# Patient Record
Sex: FEMALE | Race: WHITE | NOT HISPANIC OR LATINO | Employment: OTHER | URBAN - NONMETROPOLITAN AREA
[De-identification: names, ages, dates, MRNs, and addresses within clinical notes are randomized per-mention and may not be internally consistent; named-entity substitution may affect disease eponyms.]

---

## 2024-06-03 ENCOUNTER — PATIENT ROUNDING (BHMG ONLY) (OUTPATIENT)
Dept: UROLOGY | Facility: CLINIC | Age: 64
End: 2024-06-03
Payer: MEDICARE

## 2024-06-03 ENCOUNTER — OFFICE VISIT (OUTPATIENT)
Dept: UROLOGY | Facility: CLINIC | Age: 64
End: 2024-06-03
Payer: MEDICARE

## 2024-06-03 VITALS
BODY MASS INDEX: 30.02 KG/M2 | TEMPERATURE: 98.1 F | DIASTOLIC BLOOD PRESSURE: 68 MMHG | WEIGHT: 159 LBS | HEIGHT: 61 IN | SYSTOLIC BLOOD PRESSURE: 130 MMHG

## 2024-06-03 DIAGNOSIS — N95.8 GENITOURINARY SYNDROME OF MENOPAUSE: ICD-10-CM

## 2024-06-03 DIAGNOSIS — N39.41 URGE INCONTINENCE OF URINE: ICD-10-CM

## 2024-06-03 DIAGNOSIS — N39.0 RECURRENT UTI: Primary | ICD-10-CM

## 2024-06-03 LAB
BILIRUB BLD-MCNC: ABNORMAL MG/DL
CLARITY, POC: CLEAR
COLOR UR: ABNORMAL
EXPIRATION DATE: ABNORMAL
GLUCOSE UR STRIP-MCNC: NEGATIVE MG/DL
KETONES UR QL: ABNORMAL
LEUKOCYTE EST, POC: ABNORMAL
Lab: ABNORMAL
NITRITE UR-MCNC: NEGATIVE MG/ML
PH UR: 5 [PH] (ref 5–8)
PROT UR STRIP-MCNC: ABNORMAL MG/DL
RBC # UR STRIP: NEGATIVE /UL
SP GR UR: 1.02 (ref 1–1.03)
UROBILINOGEN UR QL: NORMAL

## 2024-06-03 PROCEDURE — 81003 URINALYSIS AUTO W/O SCOPE: CPT | Performed by: NURSE PRACTITIONER

## 2024-06-03 PROCEDURE — 1160F RVW MEDS BY RX/DR IN RCRD: CPT | Performed by: NURSE PRACTITIONER

## 2024-06-03 PROCEDURE — 51798 US URINE CAPACITY MEASURE: CPT | Performed by: NURSE PRACTITIONER

## 2024-06-03 PROCEDURE — 1159F MED LIST DOCD IN RCRD: CPT | Performed by: NURSE PRACTITIONER

## 2024-06-03 PROCEDURE — 99204 OFFICE O/P NEW MOD 45 MIN: CPT | Performed by: NURSE PRACTITIONER

## 2024-06-03 RX ORDER — OXYCODONE AND ACETAMINOPHEN 10; 325 MG/1; MG/1
TABLET ORAL
COMMUNITY
Start: 2024-05-10

## 2024-06-03 RX ORDER — DEXTROMETHORPHAN HYDROBROMIDE AND PROMETHAZINE HYDROCHLORIDE 15; 6.25 MG/5ML; MG/5ML
SYRUP ORAL
COMMUNITY
Start: 2024-03-01

## 2024-06-03 RX ORDER — BUDESONIDE AND FORMOTEROL FUMARATE DIHYDRATE 160; 4.5 UG/1; UG/1
AEROSOL RESPIRATORY (INHALATION)
COMMUNITY
Start: 2024-05-09

## 2024-06-03 RX ORDER — TIOTROPIUM BROMIDE INHALATION SPRAY 3.12 UG/1
SPRAY, METERED RESPIRATORY (INHALATION)
COMMUNITY
Start: 2024-05-09

## 2024-06-03 RX ORDER — VIBEGRON 75 MG/1
75 TABLET, FILM COATED ORAL DAILY
Qty: 30 TABLET | Refills: 11 | Status: SHIPPED | OUTPATIENT
Start: 2024-06-03

## 2024-06-03 RX ORDER — CEFDINIR 300 MG/1
300 CAPSULE ORAL 2 TIMES DAILY
COMMUNITY
Start: 2024-03-06

## 2024-06-03 RX ORDER — ALBUTEROL SULFATE 90 UG/1
AEROSOL, METERED RESPIRATORY (INHALATION)
COMMUNITY
Start: 2024-05-09

## 2024-06-03 RX ORDER — PREDNISONE 10 MG/1
TABLET ORAL
COMMUNITY
Start: 2024-02-29

## 2024-06-03 RX ORDER — DICLOFENAC SODIUM 75 MG/1
TABLET, DELAYED RELEASE ORAL
COMMUNITY
Start: 2024-05-09

## 2024-06-03 RX ORDER — ALBUTEROL SULFATE 2.5 MG/3ML
SOLUTION RESPIRATORY (INHALATION)
COMMUNITY
Start: 2024-03-06

## 2024-06-03 RX ORDER — AZITHROMYCIN 250 MG/1
TABLET, FILM COATED ORAL
COMMUNITY
Start: 2024-02-29

## 2024-06-03 RX ORDER — ESTRADIOL 0.1 MG/G
CREAM VAGINAL
Qty: 42.5 G | Refills: 3 | Status: SHIPPED | OUTPATIENT
Start: 2024-06-03

## 2024-06-03 RX ORDER — BUSPIRONE HYDROCHLORIDE 5 MG/1
5 TABLET ORAL 3 TIMES DAILY
COMMUNITY
Start: 2024-02-09

## 2024-06-03 NOTE — PROGRESS NOTES
Chief Complaint  Chief Complaint   Patient presents with    Recurrent UTI          HPI  Ms. Hawkins is a 64 y.o. female presents with complaint of urinary incontinence for 2-3 years  Here for recurrent UTIs as well symptoms is frequency     Has not previously seen urology.    Pt has primarily urge urinary incontinence.     Severity: Pt uses 0 pads a day and has tried oxybutynin 5 and 10 mg, and Myrbetriq 25 mg in the past  Associated Sx: Pt has h/o daytime frequency, urgency and nocturia x 3-4 urinating up to 3 times an hour     No h/o poor stream, straining, hesitancy or incomplete voiding     No h/o recurrent UTI, hematuria, nephrolithiasis    No vaginal discharge or bleeding  + for sensation of POP  No        Fecal incontinence  No Post-void dribbling  No Dyspareunia   Yes Constipation  1 Vaginal deliveries    Drinks 2-3 cups of coffee in the morning and 2 Dt Mt. Dew's daily   2 glasses of water  Small glasses of orange juice 2-3 times daily   Glass of milk before bed     Past Medical History  History reviewed. No pertinent past medical history.    Past Surgical History  History reviewed. No pertinent surgical history.    Medications  Current Outpatient Medications   Medication Sig Dispense Refill    albuterol (PROVENTIL) (2.5 MG/3ML) 0.083% nebulizer solution inhale ONE vial in nebulizer FOUR TIMES DAILY AS NEEDED      albuterol sulfate  (90 Base) MCG/ACT inhaler 2 puff inhale four times a day      azithromycin (ZITHROMAX) 250 MG tablet 1 tablet by mouth as directed; take 2 tablets on day 1, then 1 tablet days 2-5      busPIRone (BUSPAR) 5 MG tablet Take 1 tablet by mouth 3 (Three) Times a Day.      cefdinir (OMNICEF) 300 MG capsule Take 1 capsule by mouth 2 (Two) Times a Day.      diclofenac (VOLTAREN) 75 MG EC tablet 1 tablet by mouth twice a day; with food      oxyCODONE-acetaminophen (PERCOCET)  MG per tablet Take 1 tablet 4 times a day by oral route as needed for 30 days.      predniSONE  "(DELTASONE) 10 MG tablet TAKE 2 TABS 3 TIMES DAILY X 3 DAYS, THEN 2 TABS 2 TIMES DAILY X 3 DAYS,THEN 2 TABS ONCE DAILY FOR 3 DAYS, THEN 1 TAB DAILY FOR 3 DAYS. THEN STOP      promethazine-dextromethorphan (PROMETHAZINE-DM) 6.25-15 MG/5ML syrup FIVE ML by MOUTH four times A DAY AS needed; FOR cough AS NEEDED (will make drowsy)      Spiriva Respimat 2.5 MCG/ACT aerosol solution inhaler 2 puff inhale daily      Symbicort 160-4.5 MCG/ACT inhaler 2 puffs twice a day; whichever is covered by insurance or trelegy      estradiol (ESTRACE) 0.1 MG/GM vaginal cream Apply 0.5 gm vaginally 3 nights weekly at bedtime 42.5 g 3    Vibegron (Gemtesa) 75 MG tablet Take 1 tablet by mouth Daily. 30 tablet 11     No current facility-administered medications for this visit.       Allergies  Allergies   Allergen Reactions    Penicillins Unknown - Low Severity       Social History  Social History     Socioeconomic History    Marital status:    Tobacco Use    Passive exposure: Never       Family History  History reviewed. No pertinent family history.      Physical Exam  Visit Vitals  /68 (BP Location: Left arm, Patient Position: Sitting, Cuff Size: Adult)   Temp 98.1 °F (36.7 °C) (Temporal)   Ht 154.9 cm (61\")   Wt 72.1 kg (159 lb)   BMI 30.04 kg/m²     Physical Exam  Vitals and nursing note reviewed. Exam conducted with a chaperone present.   Constitutional:       General: She is not in acute distress.     Appearance: Normal appearance. She is not ill-appearing.   Pulmonary:      Effort: Pulmonary effort is normal. No respiratory distress.   Genitourinary:     Comments: Vulva thin dry no ulcerations or lesions, small urethral caruncle, grade 1 cystocele, no notable rectocele  Skin:     General: Skin is warm and dry.   Neurological:      General: No focal deficit present.      Mental Status: She is alert and oriented to person, place, and time.   Psychiatric:         Mood and Affect: Mood normal.         Behavior: Behavior " "normal.          Labs  Brief Urine Lab Results  (Last result in the past 365 days)        Color   Clarity   Blood   Leuk Est   Nitrite   Protein   CREAT   Urine HCG        06/03/24 1304 Dark Yellow   Clear   Negative   Small (1+)   Negative   Trace                   No results found for: \"GLUCOSE\", \"CALCIUM\", \"NA\", \"K\", \"CO2\", \"CL\", \"BUN\", \"CREATININE\", \"EGFRIFAFRI\", \"EGFRIFNONA\", \"BCR\", \"ANIONGAP\"    No results found for: \"WBC\", \"HGB\", \"HCT\", \"MCV\", \"PLT\"    PVR  Post-void residual performed by staff - 0nl    I have personally reviewed her labs and post void residual imaging.     Assessment  Ms. Hawkins is a 64 y.o. female with urge urinary incontinence and GSM with Jude symptoms.    We discussed the AUA guidelines for urge urinary incontinence.  We discussed lifestyle changes such as weight reduction and caffeine reduction, as well as medications such as anticholinergics and beta agonist.  We discussed third line therapies, such as Botox and InterStim. The patient has elected trial of beta agonist.  We discussed the risks, benefits, and alternatives to this approach.  She voiced her understanding and wished to proceed.    We discussed patient has no positive cultures cultures from PCP office show insignificant growth.  I think her symptoms are more related to GSM and overactive bladder with urge incontinence.  She does have a small grade 1 cystocele not need surgical management.  We discussed retrying topical vaginal estradiol for at least 3 months.      Plan  1.  Start Gemtesa 75 mg daily  2.  Start topical vaginal estradiol 0.5 mg Monday Wednesday Friday nights  3.  We discussed aggressive treatment of constipation recommend patient start daily MiraLAX 1 capful until she is having daily bowel movements then can decrease as needed and start daily fiber supplement such as Metamucil  4.  Stop diet Mountain Dew's in the evening  5.  Increase water intake to 4-16 ounce bottles daily      Elizabeth rPice, APRN, " NP-C  [facility] [place]

## 2024-06-07 NOTE — PROGRESS NOTES
June 7, 2024    Hello, may I speak with Fabiola Hawkins? Spoke with pt.    My name is Nichol.      I am  with [facility]  [address]  [facility] [address] [phone].    Before we get started may I verify your date of birth? 1960    I am calling to officially welcome you to our practice and ask about your recent visit. Is this a good time to talk? Yes.    Tell me about your visit with us. What things went well?  Pt feels like she is finally going to get some relief maybe and is hopeful, she said she felt very comfortable and at ease in our clinic.       We're always looking for ways to make our patients' experiences even better. Do you have recommendations on ways we may improve?  No.    Overall were you satisfied with your first visit to our practice? Yes.       I appreciate you taking the time to speak with me today. Is there anything else I can do for you? No.      Thank you, and have a great day.

## 2024-06-19 ENCOUNTER — TELEPHONE (OUTPATIENT)
Dept: UROLOGY | Facility: CLINIC | Age: 64
End: 2024-06-19

## 2024-06-19 NOTE — TELEPHONE ENCOUNTER
Caller: AWAIS MELGAR    Relationship: SELF    Best call back number: [phone]    What is your medical concern? INCOMING CALL FROM PT. PT SAYS SHE HAS DEVELOPED ANOTHER UTI ON THE MEDICATION.     How long has this issue been going on? 4TH DAY OF TAKING GEMTESA    Is your provider already aware of this issue? NO    Have you been treated for this issue? NO    PT SAID SHE HAS NOT BEEN DRINKING SODA EITHER.

## 2024-06-19 NOTE — TELEPHONE ENCOUNTER
Called patient she does not have UTI symptoms she was having those symptoms when she tried overactive bladder medication Gemtesa and has had that symptom with other overactive bladder medicines she stopped the Gemtesa and those symptoms have went away.  We discussed we will have her continue estrogen cream for 3 months see if this improves her symptoms and reevaluate.

## 2024-09-03 ENCOUNTER — OFFICE VISIT (OUTPATIENT)
Dept: UROLOGY | Facility: CLINIC | Age: 64
End: 2024-09-03
Payer: MEDICARE

## 2024-09-03 VITALS
SYSTOLIC BLOOD PRESSURE: 130 MMHG | WEIGHT: 159 LBS | RESPIRATION RATE: 19 BRPM | BODY MASS INDEX: 30.02 KG/M2 | DIASTOLIC BLOOD PRESSURE: 72 MMHG | HEIGHT: 61 IN | OXYGEN SATURATION: 98 % | HEART RATE: 69 BPM

## 2024-09-03 DIAGNOSIS — R51.9 FREQUENT HEADACHES: ICD-10-CM

## 2024-09-03 DIAGNOSIS — N95.8 GENITOURINARY SYNDROME OF MENOPAUSE: Primary | ICD-10-CM

## 2024-09-03 DIAGNOSIS — N39.41 URGE INCONTINENCE OF URINE: ICD-10-CM

## 2024-09-03 PROBLEM — E11.9 TYPE 2 DIABETES MELLITUS WITHOUT COMPLICATION, WITHOUT LONG-TERM CURRENT USE OF INSULIN: Chronic | Status: ACTIVE | Noted: 2024-09-03

## 2024-09-03 PROBLEM — J44.9 COPD (CHRONIC OBSTRUCTIVE PULMONARY DISEASE): Status: ACTIVE | Noted: 2024-09-03

## 2024-09-03 PROBLEM — F33.0 MAJOR DEPRESSIVE DISORDER, RECURRENT, MILD: Status: ACTIVE | Noted: 2024-09-03

## 2024-09-03 PROBLEM — E78.2 MIXED HYPERLIPIDEMIA: Status: ACTIVE | Noted: 2024-09-03

## 2024-09-03 PROBLEM — F41.1 GENERALIZED ANXIETY DISORDER: Status: ACTIVE | Noted: 2024-09-03

## 2024-09-03 RX ORDER — VIBEGRON 75 MG/1
75 TABLET, FILM COATED ORAL DAILY
Qty: 30 TABLET | Refills: 11 | Status: SHIPPED | OUTPATIENT
Start: 2024-09-03

## 2024-09-03 RX ORDER — TOPIRAMATE 25 MG/1
TABLET, FILM COATED ORAL
COMMUNITY
Start: 2024-08-07

## 2024-09-03 NOTE — PROGRESS NOTES
Office Visit General Established Female Patient     Patient Name: Fabiola Hawkins  : 1960   MRN: [id number]     Chief Complaint:   Chief Complaint   Patient presents with    Follow-up     3 month        Referring Provider: No ref. provider found    History of Present Illness: Fabiola Hawkins is a 64 y.o. female with history below in assessment, who presents for follow up.   She is using the estrogen cream and is not having Uti symptoms  She has been treating her constipation and she is having a BM daily  She doesn't remember is she has taken the Gemtesa she has a lot of stress and anxiety so she forgets new meds she then reported she tried the Gemtesa and felt like she was getting UTI symptoms  Having a lot of headaches her PCP did send in Topirmate this did not help her headaches  Patient reports she has stopped drinking Mountain Dew's and is increasing her fluid intake of 64 ounces water daily she does flavor her water.    Subjective      Review of System:   As noted in HPI     Past Medical History: History reviewed. No pertinent past medical history.    Past Surgical History: History reviewed. No pertinent surgical history.    Family History: History reviewed. No pertinent family history.    Social History:   Social History     Socioeconomic History    Marital status:    Tobacco Use    Smoking status: Every Day     Current packs/day: 1.00     Types: Cigarettes     Passive exposure: Never    Smokeless tobacco: Never   Vaping Use    Vaping status: Never Used   Substance and Sexual Activity    Alcohol use: Not Currently     Comment: 1 social drink    Drug use: Never    Sexual activity: Not Currently       Medications:     Current Outpatient Medications:     topiramate (TOPAMAX) 25 MG tablet, TAKE ONE TO TWO TABLETS BY MOUTH EVERY DAY AS NEEDED FOR PREVENTION OF MIGRAINE HEADACHE, Disp: , Rfl:     Vibegron (Gemtesa) 75 MG tablet, Take 1 tablet by mouth Daily., Disp: 30 tablet, Rfl: 11     "albuterol (PROVENTIL) (2.5 MG/3ML) 0.083% nebulizer solution, inhale ONE vial in nebulizer FOUR TIMES DAILY AS NEEDED, Disp: , Rfl:     albuterol sulfate  (90 Base) MCG/ACT inhaler, 2 puff inhale four times a day, Disp: , Rfl:     busPIRone (BUSPAR) 5 MG tablet, Take 1 tablet by mouth 3 (Three) Times a Day., Disp: , Rfl:     diclofenac (VOLTAREN) 75 MG EC tablet, 1 tablet by mouth twice a day; with food, Disp: , Rfl:     estradiol (ESTRACE) 0.1 MG/GM vaginal cream, Apply 0.5 gm vaginally 3 nights weekly at bedtime, Disp: 42.5 g, Rfl: 3    oxyCODONE-acetaminophen (PERCOCET)  MG per tablet, Take 1 tablet 4 times a day by oral route as needed for 30 days., Disp: , Rfl:     predniSONE (DELTASONE) 10 MG tablet, TAKE 2 TABS 3 TIMES DAILY X 3 DAYS, THEN 2 TABS 2 TIMES DAILY X 3 DAYS,THEN 2 TABS ONCE DAILY FOR 3 DAYS, THEN 1 TAB DAILY FOR 3 DAYS. THEN STOP, Disp: , Rfl:     promethazine-dextromethorphan (PROMETHAZINE-DM) 6.25-15 MG/5ML syrup, FIVE ML by MOUTH four times A DAY AS needed; FOR cough AS NEEDED (will make drowsy), Disp: , Rfl:     Spiriva Respimat 2.5 MCG/ACT aerosol solution inhaler, 2 puff inhale daily, Disp: , Rfl:     Symbicort 160-4.5 MCG/ACT inhaler, 2 puffs twice a day; whichever is covered by insurance or trelegy, Disp: , Rfl:     Allergies:   Allergies   Allergen Reactions    Penicillins Unknown - Low Severity       Objective     Physical Exam:   Vital Signs:   Visit Vitals  /72   Pulse 69   Resp 19   Ht 154.9 cm (60.98\")   Wt 72.1 kg (159 lb)   SpO2 98%   BMI 30.06 kg/m²      Body mass index is 30.06 kg/m².     Physical Exam  Vitals and nursing note reviewed.   Constitutional:       General: She is not in acute distress.     Appearance: Normal appearance. She is obese. She is not ill-appearing.   Pulmonary:      Effort: Pulmonary effort is normal. No respiratory distress.   Skin:     General: Skin is warm and dry.   Neurological:      General: No focal deficit present.      Mental " "Status: She is alert and oriented to person, place, and time.   Psychiatric:         Mood and Affect: Mood normal.         Behavior: Behavior normal.          Labs  Brief Urine Lab Results  (Last result in the past 365 days)        Color   Clarity   Blood   Leuk Est   Nitrite   Protein   CREAT   Urine HCG        06/03/24 1304 Dark Yellow   Clear   Negative   Small (1+)   Negative   Trace                   No results found for: \"GLUCOSE\", \"CALCIUM\", \"NA\", \"K\", \"CO2\", \"CL\", \"BUN\", \"CREATININE\", \"EGFRIFAFRI\", \"EGFRIFNONA\", \"BCR\", \"ANIONGAP\"    No results found for: \"WBC\", \"HGB\", \"HCT\", \"MCV\", \"PLT\"         Radiographic Studies  No Images in the past 120 days found..    I have reviewed the above labs and imaging.       Assessment / Plan      Assessment/Plan:   Diagnoses and all orders for this visit:    1. Genitourinary syndrome of menopause (Primary)    2. Urge incontinence of urine  -     Vibegron (Gemtesa) 75 MG tablet; Take 1 tablet by mouth Daily.  Dispense: 30 tablet; Refill: 11    3. Frequent headaches    63 yo female with PMH of DM type 2, COPD, anxiety and depression here to follow-up with GSM and UUI.  Patient is using topical vaginal estradiol 3 nights weekly and having no UTI symptoms since our last visit.  She did have symptoms initially with her first 1 to 2 days of Gemtesa I feel these were likely related to GSM and not medication.  We discussed restarting Gemtesa for trial for UUI, if no improvements patient will return and we will schedule her for bladder Botox she is not interested in InterStim.  We also discussed for headaches referral to neurology patient wishes to discuss with her PCP and see what his recommendations are first.    Continue topical vaginal estradiol 3 nights weekly  Restart Gemtesa 75 mg daily  Referral to neurology declined    Follow Up:   Return in about 6 months (around 3/3/2025) for Follow up Elizabeth.    Elizabeth Price, SRINATH, NP-C  [facility] [place]   "

## 2025-03-04 ENCOUNTER — OFFICE VISIT (OUTPATIENT)
Dept: UROLOGY | Facility: CLINIC | Age: 65
End: 2025-03-04
Payer: MEDICARE

## 2025-03-04 VITALS
TEMPERATURE: 96.9 F | HEART RATE: 91 BPM | WEIGHT: 162.8 LBS | SYSTOLIC BLOOD PRESSURE: 120 MMHG | DIASTOLIC BLOOD PRESSURE: 58 MMHG | OXYGEN SATURATION: 92 % | BODY MASS INDEX: 30.73 KG/M2 | HEIGHT: 61 IN

## 2025-03-04 DIAGNOSIS — N95.8 GENITOURINARY SYNDROME OF MENOPAUSE: ICD-10-CM

## 2025-03-04 DIAGNOSIS — N39.41 URGE INCONTINENCE OF URINE: Primary | ICD-10-CM

## 2025-03-04 RX ORDER — AMLODIPINE BESYLATE 5 MG/1
5 TABLET ORAL DAILY
COMMUNITY
Start: 2025-01-14

## 2025-03-04 RX ORDER — SITAGLIPTIN 50 MG/1
1 TABLET, FILM COATED ORAL DAILY
COMMUNITY
Start: 2024-12-11

## 2025-03-04 RX ORDER — LEVOFLOXACIN 750 MG/1
750 TABLET, FILM COATED ORAL DAILY
COMMUNITY
Start: 2025-01-14

## 2025-03-04 RX ORDER — HYDROXYZINE HYDROCHLORIDE 25 MG/1
TABLET, FILM COATED ORAL
COMMUNITY
Start: 2025-02-17

## 2025-03-04 RX ORDER — GLYBURIDE 2.5 MG/1
1 TABLET ORAL DAILY
COMMUNITY
Start: 2024-12-11

## 2025-03-04 NOTE — PROGRESS NOTES
Office Visit     Patient Name: Fabiola Hawkins  : 1960   MRN: [id number]     Patient or patient representative verbalized consent for the use of Ambient Listening during the visit with  SRINATH Thomas for chart documentation. 3/4/2025  09:44 EST    Chief Complaint:   Chief Complaint   Patient presents with    Follow-up     6M f/u      Referring Provider: No ref. provider found    Primary Care Provider: Brian Zamudio PA     History of Present Illness  The patient is a 64-year-old female here for a follow-up visit.    Urinary Frequency  - Persistent urinary frequency, often more than once per hour, and occasionally up to four times within an hour  - Interfering with daily activities  - Reports urinary leakage  - Suspects anxiety may contribute to her symptoms  - On Gemtesa and vaginal estrogen cream.          Subjective   Review of System:   As noted in HPI.    History reviewed. No pertinent past medical history.  History reviewed. No pertinent surgical history.  History reviewed. No pertinent family history.  Social History     Socioeconomic History    Marital status:    Tobacco Use    Smoking status: Some Days     Current packs/day: 1.00     Types: Cigarettes     Passive exposure: Current    Smokeless tobacco: Never   Vaping Use    Vaping status: Never Used   Substance and Sexual Activity    Alcohol use: Not Currently     Comment: 1 social drink    Drug use: Never    Sexual activity: Not Currently       Current Outpatient Medications:     albuterol (PROVENTIL) (2.5 MG/3ML) 0.083% nebulizer solution, inhale ONE vial in nebulizer FOUR TIMES DAILY AS NEEDED, Disp: , Rfl:     albuterol sulfate  (90 Base) MCG/ACT inhaler, 2 puff inhale four times a day, Disp: , Rfl:     amLODIPine (NORVASC) 5 MG tablet, Take 1 tablet by mouth Daily., Disp: , Rfl:     diclofenac (VOLTAREN) 75 MG EC tablet, 1 tablet by mouth twice a day; with food, Disp: , Rfl:     estradiol (ESTRACE) 0.1 MG/GM  "vaginal cream, Apply 0.5 gm vaginally 3 nights weekly at bedtime, Disp: 42.5 g, Rfl: 3    glyburide (DIAbeta) 2.5 MG tablet, Take 1 tablet by mouth Daily., Disp: , Rfl:     hydrOXYzine (ATARAX) 25 MG tablet, 1 tablet by mouth four times a day as needed; increased from 20mg aid 1/9/25, Disp: , Rfl:     Januvia 50 MG tablet, Take 1 tablet by mouth Daily., Disp: , Rfl:     levoFLOXacin (LEVAQUIN) 750 MG tablet, Take 1 tablet by mouth Daily., Disp: , Rfl:     oxyCODONE-acetaminophen (PERCOCET)  MG per tablet, Take 1 tablet 4 times a day by oral route as needed for 30 days., Disp: , Rfl:     Spiriva Respimat 2.5 MCG/ACT aerosol solution inhaler, 2 puff inhale daily, Disp: , Rfl:     Symbicort 160-4.5 MCG/ACT inhaler, 2 puffs twice a day; whichever is covered by insurance or trelegy, Disp: , Rfl:     Vibegron (Gemtesa) 75 MG tablet, Take 1 tablet by mouth Daily., Disp: 30 tablet, Rfl: 11    Allergies   Allergen Reactions    Penicillins Unknown - Low Severity     Objective   Visit Vitals  /58 (BP Location: Right arm, Patient Position: Sitting, Cuff Size: Adult)   Pulse 91   Temp 96.9 °F (36.1 °C) (Infrared)   Ht 154.9 cm (60.98\")   Wt 73.8 kg (162 lb 12.8 oz)   SpO2 92%   BMI 30.78 kg/m²        Body mass index is 30.78 kg/m².     Physical Exam  Vitals and nursing note reviewed.   Constitutional:       General: She is not in acute distress.     Appearance: Normal appearance. She is overweight. She is not ill-appearing.   Pulmonary:      Effort: Pulmonary effort is normal. No respiratory distress.   Skin:     General: Skin is warm and dry.   Neurological:      General: No focal deficit present.      Mental Status: She is alert and oriented to person, place, and time.   Psychiatric:         Mood and Affect: Mood normal.         Behavior: Behavior normal.          Labs  Lab Results   Component Value Date    COLORU Dark Yellow 06/03/2024    CLARITYU Clear 06/03/2024    SPECGRAV 1.020 06/03/2024    PHUR 5.0 " "06/03/2024    LEUKOCYTESUR Small (1+) (A) 06/03/2024    NITRITE Negative 06/03/2024    PROTEINPOCUA Trace (A) 06/03/2024    GLUCOSEUR Negative 06/03/2024    KETONESU Trace (A) 06/03/2024    UROBILINOGEN Normal 06/03/2024    BILIRUBINUR Small (1+) (A) 06/03/2024    RBCUR Negative 06/03/2024      No results found for: \"WBCUA\", \"RBCUA\", \"BACTERIA\", \"HYALCASTU\", \"SQUAMEPIUA\"     No results found for: \"WBC\", \"HGB\", \"HCT\", \"MCV\", \"PLT\"  No results found for: \"GLUCOSE\", \"CALCIUM\", \"NA\", \"K\", \"CO2\", \"CL\", \"BUN\", \"CREATININE\", \"EGFR\", \"BCR\", \"ANIONGAP\", \"ALT\", \"AST\"  No results found for: \"HGBA1C\"  No results found for: \"URICACIDSTN\", \"WVHE5OTEVHZ\", \"HGVE3CJMUP\", \"LABMAGN\"  No results found for: \"CEKG45ZX\", \"CAION\", \"PTH\", \"URICACID\"  No results found for: \"CYSTINE\", \"URINEVOLUM\", \"CALCIUMUR\", \"OXALATEU\", \"CITRATEUR\", \"LABPH\", \"URICUR\", \"NAUR\", \"KUR\", \"MAGNESIUMUR\", \"PHOSUR\", \"AMMONIUMUR\", \"CLUR\", \"XUNNJYWBQ54B\", \"UREAUR\", \"LABCREAUR\"    No results found for: \"ATOPOBIUMV\", \"BVAB2\", \"MEGASPHAER\", \"CALBICANSN\", \"CGLABRATAN\", \"CPARAPSILOS\", \"CLUSITANIAE\", \"CKRUSEI\", \"TRICHVAG\", \"CHLAMNAA\", \"NGONORRHON\", \"UREAPLASMA\", \"MYCOPLASMAG\"    No results found for: \"FERRITIN\", \"FSH\", \"SEXMONB\", \"TSH\", \"FREET4\", \"T3FREE\", \"TPOABRFLX\", \"TCRVUHKJ41\", \"WXVU63II\", \"CORTISOL\", \"TLESTROGENS\", \"TESTOSTEROTT\", \"TESTFRE\", \"LIPIDEXCLUSI\"      Radiographic Studies  No Images in the past 120 days found..    I have reviewed the above labs and imaging.     Assessment / Plan      Diagnoses and all orders for this visit:    1. Urge incontinence of urine (Primary)    2. Genitourinary syndrome of menopause         Assessment & Plan  1. Overactive bladder: Chronic persistent worsening symptoms while on Gemtesa. Reports frequent urination, sometimes up to four times an hour, despite Gemtesa. Anxiety may contribute.  - Continue Gemtesa 75 mg daily to reevaluate six months  - Provide information on InterStim therapy  - Consider InterStim if dissatisfied with " Gemtesa  - PVR next visit    2.  Genitourinary syndrome of menopause: Stable  -Continue topical vaginal estradiol 2-3 nights weekly for UTI prevention.  -No refills needed at this time    Follow-up  - Return in 6 months       Return in about 6 weeks (around 4/15/2025) for Elizabeth.    Elizabeth Price, MSN, APRN, FNP-C  [facility] [place]